# Patient Record
Sex: FEMALE | Race: WHITE | Employment: FULL TIME | URBAN - METROPOLITAN AREA
[De-identification: names, ages, dates, MRNs, and addresses within clinical notes are randomized per-mention and may not be internally consistent; named-entity substitution may affect disease eponyms.]

---

## 2021-11-04 ENCOUNTER — TRANSCRIBE ORDER (OUTPATIENT)
Dept: GENERAL RADIOLOGY | Age: 66
End: 2021-11-04

## 2021-11-04 ENCOUNTER — HOSPITAL ENCOUNTER (OUTPATIENT)
Dept: GENERAL RADIOLOGY | Age: 66
Discharge: HOME | End: 2021-11-04
Payer: COMMERCIAL

## 2021-11-04 DIAGNOSIS — M05.79 RHEUMATOID ARTHRITIS OF MULTIPLE SITES WITHOUT ORGAN OR SYSTEM INVOLVEMENT WITH POSITIVE RHEUMATOID FACTOR (HCC): Primary | ICD-10-CM

## 2021-11-04 DIAGNOSIS — M05.79 RHEUMATOID ARTHRITIS OF MULTIPLE SITES WITHOUT ORGAN OR SYSTEM INVOLVEMENT WITH POSITIVE RHEUMATOID FACTOR (HCC): ICD-10-CM

## 2021-11-04 PROCEDURE — 72050 X-RAY EXAM NECK SPINE 4/5VWS: CPT | Performed by: NURSE PRACTITIONER

## 2021-11-04 PROCEDURE — 73630 X-RAY EXAM OF FOOT: CPT | Performed by: NURSE PRACTITIONER

## 2021-11-04 PROCEDURE — 73562 X-RAY EXAM OF KNEE 3: CPT | Performed by: NURSE PRACTITIONER

## 2021-11-04 PROCEDURE — 72110 X-RAY EXAM L-2 SPINE 4/>VWS: CPT | Performed by: NURSE PRACTITIONER

## 2021-11-04 PROCEDURE — 73130 X-RAY EXAM OF HAND: CPT | Performed by: NURSE PRACTITIONER

## 2022-09-17 ENCOUNTER — HOSPITAL ENCOUNTER (OUTPATIENT)
Age: 67
Setting detail: OBSERVATION
Discharge: HOME | End: 2022-09-18
Attending: EMERGENCY MEDICINE | Admitting: INTERNAL MEDICINE
Payer: COMMERCIAL

## 2022-09-17 ENCOUNTER — APPOINTMENT (OUTPATIENT)
Dept: CT IMAGING | Age: 67
End: 2022-09-17
Attending: EMERGENCY MEDICINE
Payer: COMMERCIAL

## 2022-09-17 ENCOUNTER — APPOINTMENT (OUTPATIENT)
Dept: MRI IMAGING | Age: 67
End: 2022-09-17
Attending: EMERGENCY MEDICINE
Payer: COMMERCIAL

## 2022-09-17 ENCOUNTER — APPOINTMENT (OUTPATIENT)
Dept: GENERAL RADIOLOGY | Age: 67
End: 2022-09-17
Attending: EMERGENCY MEDICINE
Payer: COMMERCIAL

## 2022-09-17 DIAGNOSIS — R42 VERTIGO: Primary | ICD-10-CM

## 2022-09-17 DIAGNOSIS — R29.90 STROKE-LIKE SYMPTOMS: ICD-10-CM

## 2022-09-17 PROBLEM — E86.0 DEHYDRATION: Status: ACTIVE | Noted: 2022-09-17

## 2022-09-17 PROBLEM — I01.1 RHEUMATOID AORTITIS: Status: ACTIVE | Noted: 2022-09-17

## 2022-09-17 PROBLEM — R11.2 NAUSEA AND VOMITING: Status: ACTIVE | Noted: 2022-09-17

## 2022-09-17 LAB
ALBUMIN SERPL-MCNC: 3.8 G/DL (ref 3.5–5)
ALBUMIN/GLOB SERPL: 1.2 {RATIO} (ref 1.1–2.2)
ALP SERPL-CCNC: 86 U/L (ref 45–117)
ALT SERPL-CCNC: 24 U/L (ref 12–78)
ANION GAP SERPL CALC-SCNC: 2 MMOL/L (ref 5–15)
AST SERPL-CCNC: 24 U/L (ref 15–37)
ATRIAL RATE: 64 BPM
BASOPHILS # BLD: 0 K/UL (ref 0–0.1)
BASOPHILS NFR BLD: 1 % (ref 0–1)
BILIRUB SERPL-MCNC: 0.4 MG/DL (ref 0.2–1)
BUN SERPL-MCNC: 12 MG/DL (ref 6–20)
BUN/CREAT SERPL: 13 (ref 12–20)
CALCIUM SERPL-MCNC: 9 MG/DL (ref 8.5–10.1)
CALCULATED P AXIS, ECG09: 81 DEGREES
CALCULATED R AXIS, ECG10: 59 DEGREES
CALCULATED T AXIS, ECG11: 84 DEGREES
CHLORIDE SERPL-SCNC: 108 MMOL/L (ref 97–108)
CO2 SERPL-SCNC: 29 MMOL/L (ref 21–32)
COMMENT, HOLDF: NORMAL
CREAT SERPL-MCNC: 0.92 MG/DL (ref 0.55–1.02)
DIAGNOSIS, 93000: NORMAL
DIFFERENTIAL METHOD BLD: ABNORMAL
EOSINOPHIL # BLD: 0.1 K/UL (ref 0–0.4)
EOSINOPHIL NFR BLD: 2 % (ref 0–7)
ERYTHROCYTE [DISTWIDTH] IN BLOOD BY AUTOMATED COUNT: 12.5 % (ref 11.5–14.5)
GLOBULIN SER CALC-MCNC: 3.2 G/DL (ref 2–4)
GLUCOSE BLD STRIP.AUTO-MCNC: 92 MG/DL (ref 65–117)
GLUCOSE SERPL-MCNC: 122 MG/DL (ref 65–100)
HCT VFR BLD AUTO: 36.3 % (ref 35–47)
HGB BLD-MCNC: 12 G/DL (ref 11.5–16)
IMM GRANULOCYTES # BLD AUTO: 0 K/UL (ref 0–0.04)
IMM GRANULOCYTES NFR BLD AUTO: 0 % (ref 0–0.5)
INR PPP: 1 (ref 0.9–1.1)
LYMPHOCYTES # BLD: 0.7 K/UL (ref 0.8–3.5)
LYMPHOCYTES NFR BLD: 14 % (ref 12–49)
MCH RBC QN AUTO: 30.5 PG (ref 26–34)
MCHC RBC AUTO-ENTMCNC: 33.1 G/DL (ref 30–36.5)
MCV RBC AUTO: 92.4 FL (ref 80–99)
MONOCYTES # BLD: 0.3 K/UL (ref 0–1)
MONOCYTES NFR BLD: 7 % (ref 5–13)
NEUTS SEG # BLD: 3.7 K/UL (ref 1.8–8)
NEUTS SEG NFR BLD: 76 % (ref 32–75)
NRBC # BLD: 0 K/UL (ref 0–0.01)
NRBC BLD-RTO: 0 PER 100 WBC
P-R INTERVAL, ECG05: 208 MS
PLATELET # BLD AUTO: 218 K/UL (ref 150–400)
PMV BLD AUTO: 9.4 FL (ref 8.9–12.9)
POTASSIUM SERPL-SCNC: 3.8 MMOL/L (ref 3.5–5.1)
PROT SERPL-MCNC: 7 G/DL (ref 6.4–8.2)
PROTHROMBIN TIME: 10.8 SEC (ref 9–11.1)
Q-T INTERVAL, ECG07: 442 MS
QRS DURATION, ECG06: 80 MS
QTC CALCULATION (BEZET), ECG08: 455 MS
RBC # BLD AUTO: 3.93 M/UL (ref 3.8–5.2)
RBC MORPH BLD: ABNORMAL
SAMPLES BEING HELD,HOLD: NORMAL
SERVICE CMNT-IMP: NORMAL
SODIUM SERPL-SCNC: 139 MMOL/L (ref 136–145)
TROPONIN-HIGH SENSITIVITY: 5 NG/L (ref 0–51)
VENTRICULAR RATE, ECG03: 64 BPM
WBC # BLD AUTO: 4.8 K/UL (ref 3.6–11)

## 2022-09-17 PROCEDURE — 80053 COMPREHEN METABOLIC PANEL: CPT

## 2022-09-17 PROCEDURE — 74011250636 HC RX REV CODE- 250/636: Performed by: INTERNAL MEDICINE

## 2022-09-17 PROCEDURE — 71045 X-RAY EXAM CHEST 1 VIEW: CPT

## 2022-09-17 PROCEDURE — A9576 INJ PROHANCE MULTIPACK: HCPCS | Performed by: RADIOLOGY

## 2022-09-17 PROCEDURE — 93005 ELECTROCARDIOGRAM TRACING: CPT

## 2022-09-17 PROCEDURE — 96372 THER/PROPH/DIAG INJ SC/IM: CPT

## 2022-09-17 PROCEDURE — 74011250636 HC RX REV CODE- 250/636: Performed by: RADIOLOGY

## 2022-09-17 PROCEDURE — 0042T CT CODE NEURO PERF W CBF: CPT

## 2022-09-17 PROCEDURE — 99285 EMERGENCY DEPT VISIT HI MDM: CPT

## 2022-09-17 PROCEDURE — 74011250636 HC RX REV CODE- 250/636: Performed by: EMERGENCY MEDICINE

## 2022-09-17 PROCEDURE — 74011250637 HC RX REV CODE- 250/637: Performed by: INTERNAL MEDICINE

## 2022-09-17 PROCEDURE — 82962 GLUCOSE BLOOD TEST: CPT

## 2022-09-17 PROCEDURE — 70450 CT HEAD/BRAIN W/O DYE: CPT

## 2022-09-17 PROCEDURE — 74011000636 HC RX REV CODE- 636: Performed by: EMERGENCY MEDICINE

## 2022-09-17 PROCEDURE — 70553 MRI BRAIN STEM W/O & W/DYE: CPT

## 2022-09-17 PROCEDURE — 84484 ASSAY OF TROPONIN QUANT: CPT

## 2022-09-17 PROCEDURE — 85025 COMPLETE CBC W/AUTO DIFF WBC: CPT

## 2022-09-17 PROCEDURE — 96374 THER/PROPH/DIAG INJ IV PUSH: CPT

## 2022-09-17 PROCEDURE — G0378 HOSPITAL OBSERVATION PER HR: HCPCS

## 2022-09-17 PROCEDURE — 70496 CT ANGIOGRAPHY HEAD: CPT

## 2022-09-17 PROCEDURE — 85610 PROTHROMBIN TIME: CPT

## 2022-09-17 PROCEDURE — 74011250637 HC RX REV CODE- 250/637: Performed by: EMERGENCY MEDICINE

## 2022-09-17 PROCEDURE — 36415 COLL VENOUS BLD VENIPUNCTURE: CPT

## 2022-09-17 RX ORDER — ACETAMINOPHEN 325 MG/1
650 TABLET ORAL
Status: DISCONTINUED | OUTPATIENT
Start: 2022-09-17 | End: 2022-09-18 | Stop reason: HOSPADM

## 2022-09-17 RX ORDER — ACETAMINOPHEN 650 MG/1
650 SUPPOSITORY RECTAL
Status: DISCONTINUED | OUTPATIENT
Start: 2022-09-17 | End: 2022-09-18 | Stop reason: HOSPADM

## 2022-09-17 RX ORDER — LABETALOL HYDROCHLORIDE 5 MG/ML
5 INJECTION, SOLUTION INTRAVENOUS
Status: DISCONTINUED | OUTPATIENT
Start: 2022-09-17 | End: 2022-09-18 | Stop reason: HOSPADM

## 2022-09-17 RX ORDER — GUAIFENESIN 100 MG/5ML
81 LIQUID (ML) ORAL DAILY
Status: DISCONTINUED | OUTPATIENT
Start: 2022-09-18 | End: 2022-09-18 | Stop reason: HOSPADM

## 2022-09-17 RX ORDER — MELOXICAM 15 MG/1
15 TABLET ORAL DAILY
COMMUNITY

## 2022-09-17 RX ORDER — MELOXICAM 7.5 MG/1
15 TABLET ORAL DAILY
Status: DISCONTINUED | OUTPATIENT
Start: 2022-09-18 | End: 2022-09-18 | Stop reason: HOSPADM

## 2022-09-17 RX ORDER — HYDROXYCHLOROQUINE SULFATE 200 MG/1
200 TABLET, FILM COATED ORAL 2 TIMES DAILY
Status: DISCONTINUED | OUTPATIENT
Start: 2022-09-17 | End: 2022-09-18 | Stop reason: HOSPADM

## 2022-09-17 RX ORDER — DIAZEPAM 5 MG/1
5 TABLET ORAL
Status: COMPLETED | OUTPATIENT
Start: 2022-09-17 | End: 2022-09-17

## 2022-09-17 RX ORDER — GUAIFENESIN 100 MG/5ML
324 LIQUID (ML) ORAL
Status: COMPLETED | OUTPATIENT
Start: 2022-09-17 | End: 2022-09-17

## 2022-09-17 RX ORDER — ENOXAPARIN SODIUM 100 MG/ML
40 INJECTION SUBCUTANEOUS EVERY 24 HOURS
Status: DISCONTINUED | OUTPATIENT
Start: 2022-09-17 | End: 2022-09-18 | Stop reason: HOSPADM

## 2022-09-17 RX ORDER — MECLIZINE HCL 12.5 MG 12.5 MG/1
25 TABLET ORAL 3 TIMES DAILY
Status: DISCONTINUED | OUTPATIENT
Start: 2022-09-17 | End: 2022-09-18 | Stop reason: HOSPADM

## 2022-09-17 RX ORDER — ONDANSETRON 2 MG/ML
4 INJECTION INTRAMUSCULAR; INTRAVENOUS
Status: COMPLETED | OUTPATIENT
Start: 2022-09-17 | End: 2022-09-17

## 2022-09-17 RX ORDER — POLYETHYLENE GLYCOL 3350 17 G/17G
17 POWDER, FOR SOLUTION ORAL DAILY PRN
Status: DISCONTINUED | OUTPATIENT
Start: 2022-09-17 | End: 2022-09-18 | Stop reason: HOSPADM

## 2022-09-17 RX ORDER — HYDROXYCHLOROQUINE SULFATE 200 MG/1
200 TABLET, FILM COATED ORAL 2 TIMES DAILY
COMMUNITY

## 2022-09-17 RX ORDER — SODIUM CHLORIDE 9 MG/ML
100 INJECTION, SOLUTION INTRAVENOUS CONTINUOUS
Status: DISCONTINUED | OUTPATIENT
Start: 2022-09-17 | End: 2022-09-18

## 2022-09-17 RX ORDER — ATORVASTATIN CALCIUM 20 MG/1
40 TABLET, FILM COATED ORAL
Status: DISCONTINUED | OUTPATIENT
Start: 2022-09-17 | End: 2022-09-18 | Stop reason: HOSPADM

## 2022-09-17 RX ADMIN — ONDANSETRON 4 MG: 2 INJECTION INTRAMUSCULAR; INTRAVENOUS at 10:45

## 2022-09-17 RX ADMIN — MECLIZINE 25 MG: 12.5 TABLET ORAL at 21:33

## 2022-09-17 RX ADMIN — SODIUM CHLORIDE 100 ML/HR: 9 INJECTION, SOLUTION INTRAVENOUS at 21:58

## 2022-09-17 RX ADMIN — DIAZEPAM 5 MG: 5 TABLET ORAL at 11:10

## 2022-09-17 RX ADMIN — ENOXAPARIN SODIUM 40 MG: 100 INJECTION SUBCUTANEOUS at 14:40

## 2022-09-17 RX ADMIN — IOPAMIDOL 50 ML: 755 INJECTION, SOLUTION INTRAVENOUS at 10:37

## 2022-09-17 RX ADMIN — MECLIZINE 25 MG: 12.5 TABLET ORAL at 14:40

## 2022-09-17 RX ADMIN — GADOTERIDOL 14 ML: 279.3 INJECTION, SOLUTION INTRAVENOUS at 13:30

## 2022-09-17 RX ADMIN — IOPAMIDOL 100 ML: 755 INJECTION, SOLUTION INTRAVENOUS at 10:38

## 2022-09-17 RX ADMIN — HYDROXYCHLOROQUINE SULFATE 200 MG: 200 TABLET ORAL at 18:53

## 2022-09-17 RX ADMIN — ASPIRIN 324 MG: 81 TABLET, CHEWABLE ORAL at 12:02

## 2022-09-17 RX ADMIN — SODIUM CHLORIDE 100 ML/HR: 9 INJECTION, SOLUTION INTRAVENOUS at 14:41

## 2022-09-17 NOTE — H&P
[facility]  [address] [facility], [facility] [address]  [phone]    Admission History and Physical      NAME:  Milagros Anthony   :   [id number]   MRN:  [id number]     PCP:  Leoncio Calles MD     Date of service:  2022         Subjective:     CHIEF COMPLAINT: Dizziness, Nausea and vomiting     HISTORY OF PRESENT ILLNESS:     Ms. Giselle Wright is a 73 y.o.  female with past medical history of rheumatoid arthritis, and mild intermittent asthma is admitted with the diagnosis of Vertigo to rule posterior circulation stroke. Ms. Giselle Wright presented to the Emergency Department today complaining of  dizziness. She was known last well at 11PM when she went to bed last night. She woke up to use bathroom this morning at around 7AM and noticed everything was spinning around. She went back to bed but since then every movement of her head triggers the spinning. She also felt nauseous and started to vomiting. She reports she vomiting several times since this morning and then dry heave. She reports generalized weakness. She denies fever, chest pain, cough, sore throat, abdominal pain, diarrhea, constipation or urinary frequency, urgency or dysuria. Vital signs on arrival to ER BP: 132/54 mmHg, WY: 61, Temp: 97.6, RR: 12 and saturating 100% on room air. CBC and CMP are unremarkable. Initial troponin is wnl. CT head, CTA head neck stroke protocol unremarkable. Past Medical History:   Diagnosis Date    Asthma     Atypical ductal hyperplasia of breast 2011    Breast lesion 2011    Hypotension     USUALLY RUNS 90/60.     Unspecified adverse effect of anesthesia     TOOK ALL DAY COMING OUT OF ANESTHESIA /P ABLATION        Past Surgical History:   Procedure Laterality Date    HX DILATION AND CURETTAGE      ENDOMETRIOSIS    HX DILATION AND CURETTAGE  /P DELIVERY    HX GI      COLONOSCOPY    HX GYN  2007    ABLATION       Social History     Tobacco Use    Smoking status: Never Smokeless tobacco: Not on file   Substance Use Topics    Alcohol use: No        Family History   Problem Relation Age of Onset    Other Mother          OF LATENT HEP C FROM BLOOD TRANSFUSION    Cancer Father         THROAT & TONGUE-NON-SMOKER    Cancer Sister          11 OF COLON CA / OVARIAN        No Known Allergies     Prior to Admission medications    Medication Sig Start Date End Date Taking? Authorizing Provider   hydrocodone-acetaminophen (VICODIN) 5-500 mg per tablet Take 1 Tab by mouth every four (4) hours as needed for Pain. 11   Corina Campbell NP   hydrocodone-acetaminophen (VICODIN) 5-500 mg per tablet Take 1 Tab by mouth every three (3) hours as needed for Pain. 11   Zack Mendez MD   cetirizine (ZYRTEC) 10 mg tablet Take  by mouth daily. Indications: ASTHMA, ALLERGY    Provider, Historical   albuterol (VENTOLIN HFA) 90 mcg/Actuation inhaler Take 1 Puff by inhalation every six (6) hours as needed. Provider, Historical   budesonide (RHINOCORT AQUA) 32 mcg/Actuation nasal spray by Nasal route as needed. Provider, Historical   MULTIVITAMIN PO Take  by mouth daily. Provider, Historical   DOCOSAHEXANOIC ACID/EPA (FISH OIL PO) Take  by mouth daily. Provider, Historical   LYSINE, BULK, by Does Not Apply route daily. Indications: MOUTH ULCERS    Provider, Historical   CALCIUM CARBONATE/VITAMIN D3 (CALCIUM + D PO) Take  by mouth. Provider, Historical   GLUCOSAM SUL NA/CHONDR LINDQUIST A NA (GLUCOSAMINE-CHONDROITIN 3X PO) Take  by mouth daily.     Provider, Historical         Review of Systems:  (bold if positive, if negative)    Gen:  , fatigue  Eyes:  ENT:  CVS:  dizziness, Pulm:  GI:  nausea, emesis, :  MS:   weakness,Skin:  Psych:  Endo:  Hem:  Renal:  Neuro:   - Dizziness         Objective:      VITALS:    Vital signs reviewed; most recent are:    Visit Vitals  BP (!) 116/52   Pulse 61   Temp 97.6 °F (36.4 °C)   Resp 14   Ht 5' 6\" (1.676 m)   Wt 72.7 kg (160 lb 4.8 oz)   SpO2 98%   BMI 25.87 kg/m²     SpO2 Readings from Last 6 Encounters:   09/17/22 98%   09/02/11 100%        No intake or output data in the 24 hours ending 09/17/22 1230         Exam:     Physical Exam:    Gen:  Well-developed, well-nourished, in no acute distress  HEENT:  Pink conjunctivae, PERRL, hearing intact to voice, moist mucous membranes. No nystagmus   Neck:  Supple, without masses, thyroid non-tender  Resp:  No accessory muscle use, clear breath sounds without wheezes rales or rhonchi  Card:  No murmurs, normal S1, S2 without thrills, bruits or peripheral edema  Abd:  Soft, non-tender, non-distended, normoactive bowel sounds are present  Lymph:  No cervical adenopathy  Musc:  No cyanosis or clubbing  Skin:  No rashes or ulcers, skin turgor is good  Neuro:  Cranial nerves are grossly intact, no focal motor weakness, follows commands appropriately  Psych:  Good insight, oriented to person, place and time, alert       Labs:    Recent Labs     09/17/22  1020   WBC 4.8   HGB 12.0   HCT 36.3        Recent Labs     09/17/22  1020      K 3.8      CO2 29   *   BUN 12   CREA 0.92   CA 9.0   ALB 3.8   TBILI 0.4   ALT 24     Lab Results   Component Value Date/Time    Glucose (POC) 92 09/17/2022 10:17 AM     No results for input(s): PH, PCO2, PO2, HCO3, FIO2 in the last 72 hours. Recent Labs     09/17/22  1020   INR 1.0       Telemetry reviewed:   normal sinus rhythm       Assessment/Plan:      Active Problems:  Nausea and vomiting (9/17/2022): IV Zofran for nausea/vomiting as needed    Vertigo (9/17/2022): rule out posterior circulation CVA, Brain MRI wo contrast. Neurology consult. ASA, Statin, rest of stroke protocol. She still reports vertigo and closing her eyes to avoid it.  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 discussed with: Patient and Nursing Staff    Discussed:  Care Plan    Prophylaxis:  Lovenox    Probable Disposition:  Home w/Family           ___________________________________________________    Attending Physician: Dante Malone MD

## 2022-09-17 NOTE — PROGRESS NOTES
1400 TRANSFER - IN REPORT:    Verbal report received from [facility] (name) on Duran Ellsworth  being received from ED (unit) for routine progression of care      Report consisted of patients Situation, Background, Assessment and   Recommendations(SBAR). Information from the following report(s) SBAR, Kardex, Intake/Output, MAR, Accordion, Recent Results, Med Rec Status, and Cardiac Rhythm NSR  was reviewed with the receiving nurse. Opportunity for questions and clarification was provided. Assessment completed upon patients arrival to unit and care assumed. This patient was assisted with Intentional Toileting every 2 hours during this shift as appropriate. Documentation of ambulation and output reflected on Flowsheet as appropriate. Purposeful hourly rounding was completed using AIDET and 5Ps. Outcomes of PHR documented as they occurred. Bed alarm in use as appropriate. Dual Suction and ambubag in place. 0915 Bedside and Verbal shift change report given to [address] [place] [address]  (oncoming nurse) by Miriam Weaver RN  (offgoing nurse). Report included the following information SBAR, Kardex, Intake/Output, MAR, Accordion, Recent Results, Med Rec Status, and Cardiac Rhythm NSR .

## 2022-09-17 NOTE — ED TRIAGE NOTES
Patient arrives from home via EMS with complaints of nausea and vomiting that started at 0200. Per EMS, patient experienced dizziness when they tried to get her out of bed. Patient reports generalized weakness. Patient's LKW 3352. Level 2 stroke called at 1014.

## 2022-09-17 NOTE — ED PROVIDER NOTES
Terrance Law is a 72 yo F with vertigo, nausea and vomiting. She woke in the early morning hours with symptoms. Last normal at 11pm when she went to bed. Vertigo constant , even when sitting still. Has nausea and vomiting but no diarrhea. Patient denies recent illness. Past Medical History:   Diagnosis Date    Asthma     Atypical ductal hyperplasia of breast 2011    Breast lesion 2011    Hypotension     USUALLY RUNS 90/60.  Unspecified adverse effect of anesthesia 2007    TOOK ALL DAY COMING OUT OF ANESTHESIA /P ABLATION       Past Surgical History:   Procedure Laterality Date    HX DILATION AND CURETTAGE      ENDOMETRIOSIS    HX DILATION AND CURETTAGE  /P DELIVERY    HX GI      COLONOSCOPY    HX GYN  2007    ABLATION         Family History:   Problem Relation Age of Onset    Other Mother          OF LATENT HEP C FROM BLOOD TRANSFUSION    Cancer Father         THROAT & TONGUE-NON-SMOKER    Cancer Sister          11 OF COLON CA / OVARIAN       Social History     Socioeconomic History    Marital status:      Spouse name: Not on file    Number of children: Not on file    Years of education: Not on file    Highest education level: Not on file   Occupational History    Not on file   Tobacco Use    Smoking status: Never    Smokeless tobacco: Not on file   Substance and Sexual Activity    Alcohol use: No    Drug use: Not on file    Sexual activity: Not on file   Other Topics Concern    Not on file   Social History Narrative    Not on file     Social Determinants of Health     Financial Resource Strain: Not on file   Food Insecurity: Not on file   Transportation Needs: Not on file   Physical Activity: Not on file   Stress: Not on file   Social Connections: Not on file   Intimate Partner Violence: Not on file   Housing Stability: Not on file         ALLERGIES: Patient has no known allergies. Review of Systems   HENT:  Negative for sore throat.     Eyes: Negative for visual disturbance. Respiratory:  Negative for cough. Skin:  Negative for rash. Vitals:    09/17/22 1014 09/17/22 1026 09/17/22 1114   BP: (!) 132/54  (!) 117/56   Pulse: 61  63   Resp: 12  21   Temp: 97.6 °F (36.4 °C)     SpO2: 100% 97% 100%   Weight: 72.7 kg (160 lb 4.8 oz)     Height: 5' 6\" (1.676 m)              Physical Exam  Vitals and nursing note reviewed. Constitutional:       General: She is not in acute distress. Appearance: She is well-developed. HENT:      Head: Normocephalic and atraumatic. Right Ear: Tympanic membrane normal. No middle ear effusion. There is no impacted cerumen. Left Ear: Tympanic membrane normal.  No middle ear effusion. There is no impacted cerumen. Mouth/Throat:      Mouth: Mucous membranes are moist.   Eyes:      Extraocular Movements: Extraocular movements intact. Right eye: Nystagmus present. Left eye: Nystagmus present. Conjunctiva/sclera: Conjunctivae normal.   Neck:      Trachea: Phonation normal.   Cardiovascular:      Rate and Rhythm: Normal rate. Pulmonary:      Effort: Pulmonary effort is normal. No respiratory distress. Abdominal:      General: There is no distension. Musculoskeletal:         General: No tenderness. Normal range of motion. Cervical back: Normal range of motion. Skin:     General: Skin is warm and dry. Neurological:      General: No focal deficit present. Mental Status: She is alert. She is not disoriented. Cranial Nerves: No dysarthria or facial asymmetry. Motor: No weakness, abnormal muscle tone or pronator drift. ED EKG interpretation:  Rhythm: normal sinus rhythm; and regular . Rate (approx.): 64; Axis: normal; P wave: normal; QRS interval: normal ; ST/T wave: normal; Other findings: normal. This EKG was interpreted by Thayne Mohs, MD,ED Provider. [facility]       10:25 AM  Discussed with teleneuro, Dr. Montell Heimlich. Will evaluate patient.     10:52 AM  Discussed with Dr. Alana Street, Teleneurology. Agrees exam consistent with posterior circulation stroke not peripheral cause. CTA/perfusion reviewed and are normal.  Recommend admission for stoke workup    Perfect Serve Consult for Admission  11:37 AM    ED Room Number: ER07/07  Patient Name and age:  Quentin Pond 73 y.o.  female  Working Diagnosis:   1. Vertigo    2. Stroke-like symptoms        COVID-19 Suspicion:  no  Sepsis present:  no  Reassessment needed: N/A  Code Status:  Full Code  Readmission: no  Isolation Requirements:  no  Recommended Level of Care:  telemetry  Department:[facility] ED - [phone]  Other:  woke in the early morning hours with vertigo. Exam consistent with posterior circulation cause rather than peripheral.  Underwent Level 2 stroke eval on arrival.  CT/CTA/Perfusion studies with no acute finding. Teleneurologist recommends admission for stroke work-up. Has received zofran, aspirin and Valium for symptoms.        Procedures

## 2022-09-18 ENCOUNTER — APPOINTMENT (OUTPATIENT)
Dept: NON INVASIVE DIAGNOSTICS | Age: 67
End: 2022-09-18
Attending: INTERNAL MEDICINE
Payer: COMMERCIAL

## 2022-09-18 VITALS
RESPIRATION RATE: 16 BRPM | DIASTOLIC BLOOD PRESSURE: 46 MMHG | SYSTOLIC BLOOD PRESSURE: 104 MMHG | OXYGEN SATURATION: 97 % | BODY MASS INDEX: 24.75 KG/M2 | HEART RATE: 62 BPM | HEIGHT: 66 IN | WEIGHT: 154 LBS | TEMPERATURE: 97.9 F

## 2022-09-18 LAB
CHOLEST SERPL-MCNC: 103 MG/DL
ECHO AV AREA PEAK VELOCITY: 2.3 CM2
ECHO AV AREA/BSA PEAK VELOCITY: 1.3 CM2/M2
ECHO AV PEAK GRADIENT: 10 MMHG
ECHO AV PEAK VELOCITY: 1.6 M/S
ECHO AV VELOCITY RATIO: 0.69
ECHO EST RA PRESSURE: 3 MMHG
ECHO LA DIAMETER INDEX: 1.84 CM/M2
ECHO LA DIAMETER: 3.3 CM
ECHO LA VOL 2C: 63 ML (ref 22–52)
ECHO LA VOL 4C: 61 ML (ref 22–52)
ECHO LA VOL BP: 65 ML (ref 22–52)
ECHO LA VOL/BSA BIPLANE: 36 ML/M2 (ref 16–34)
ECHO LA VOLUME AREA LENGTH: 75 ML
ECHO LA VOLUME INDEX A2C: 35 ML/M2 (ref 16–34)
ECHO LA VOLUME INDEX A4C: 34 ML/M2 (ref 16–34)
ECHO LA VOLUME INDEX AREA LENGTH: 42 ML/M2 (ref 16–34)
ECHO LV E' LATERAL VELOCITY: 14 CM/S
ECHO LV E' SEPTAL VELOCITY: 10 CM/S
ECHO LV FRACTIONAL SHORTENING: 31 % (ref 28–44)
ECHO LV INTERNAL DIMENSION DIASTOLE INDEX: 2.68 CM/M2
ECHO LV INTERNAL DIMENSION DIASTOLIC: 4.8 CM (ref 3.9–5.3)
ECHO LV INTERNAL DIMENSION SYSTOLIC INDEX: 1.84 CM/M2
ECHO LV INTERNAL DIMENSION SYSTOLIC: 3.3 CM
ECHO LV IVSD: 0.7 CM (ref 0.6–0.9)
ECHO LV MASS 2D: 106.9 G (ref 67–162)
ECHO LV MASS INDEX 2D: 59.7 G/M2 (ref 43–95)
ECHO LV POSTERIOR WALL DIASTOLIC: 0.7 CM (ref 0.6–0.9)
ECHO LV RELATIVE WALL THICKNESS RATIO: 0.29
ECHO LVOT AREA: 3.1 CM2
ECHO LVOT DIAM: 2 CM
ECHO LVOT PEAK GRADIENT: 5 MMHG
ECHO LVOT PEAK VELOCITY: 1.1 M/S
ECHO MV A VELOCITY: 0.81 M/S
ECHO MV E DECELERATION TIME (DT): 212.2 MS
ECHO MV E VELOCITY: 0.84 M/S
ECHO MV E/A RATIO: 1.04
ECHO MV E/E' LATERAL: 6
ECHO MV E/E' RATIO (AVERAGED): 7.2
ECHO MV E/E' SEPTAL: 8.4
ECHO RIGHT VENTRICULAR SYSTOLIC PRESSURE (RVSP): 20 MMHG
ECHO RV FREE WALL PEAK S': 14 CM/S
ECHO RV INTERNAL DIMENSION: 3.8 CM
ECHO RV TAPSE: 2.4 CM (ref 1.7–?)
ECHO RVOT PEAK GRADIENT: 2 MMHG
ECHO RVOT PEAK VELOCITY: 0.6 M/S
ECHO TV REGURGITANT MAX VELOCITY: 2.04 M/S
ECHO TV REGURGITANT PEAK GRADIENT: 17 MMHG
EST. AVERAGE GLUCOSE BLD GHB EST-MCNC: 103 MG/DL
HBA1C MFR BLD: 5.2 % (ref 4–5.6)
HDLC SERPL-MCNC: 74 MG/DL
HDLC SERPL: 1.4 {RATIO} (ref 0–5)
LDLC SERPL CALC-MCNC: 18 MG/DL (ref 0–100)
TRIGL SERPL-MCNC: 55 MG/DL (ref ?–150)
VLDLC SERPL CALC-MCNC: 11 MG/DL

## 2022-09-18 PROCEDURE — 99204 OFFICE O/P NEW MOD 45 MIN: CPT | Performed by: PSYCHIATRY & NEUROLOGY

## 2022-09-18 PROCEDURE — 93306 TTE W/DOPPLER COMPLETE: CPT | Performed by: SPECIALIST

## 2022-09-18 PROCEDURE — 36415 COLL VENOUS BLD VENIPUNCTURE: CPT

## 2022-09-18 PROCEDURE — 74011250637 HC RX REV CODE- 250/637: Performed by: INTERNAL MEDICINE

## 2022-09-18 PROCEDURE — G0378 HOSPITAL OBSERVATION PER HR: HCPCS

## 2022-09-18 PROCEDURE — 93306 TTE W/DOPPLER COMPLETE: CPT

## 2022-09-18 PROCEDURE — 74011250636 HC RX REV CODE- 250/636: Performed by: INTERNAL MEDICINE

## 2022-09-18 PROCEDURE — 83036 HEMOGLOBIN GLYCOSYLATED A1C: CPT

## 2022-09-18 PROCEDURE — 80061 LIPID PANEL: CPT

## 2022-09-18 PROCEDURE — 97161 PT EVAL LOW COMPLEX 20 MIN: CPT

## 2022-09-18 PROCEDURE — 97165 OT EVAL LOW COMPLEX 30 MIN: CPT | Performed by: OCCUPATIONAL THERAPIST

## 2022-09-18 RX ORDER — MECLIZINE HYDROCHLORIDE 25 MG/1
25 TABLET ORAL
Qty: 30 TABLET | Refills: 0 | Status: SHIPPED | OUTPATIENT
Start: 2022-09-18 | End: 2022-09-28

## 2022-09-18 RX ADMIN — HYDROXYCHLOROQUINE SULFATE 200 MG: 200 TABLET ORAL at 09:11

## 2022-09-18 RX ADMIN — ASPIRIN 81 MG: 81 TABLET, CHEWABLE ORAL at 09:11

## 2022-09-18 RX ADMIN — MELOXICAM 15 MG: 7.5 TABLET ORAL at 09:12

## 2022-09-18 RX ADMIN — MECLIZINE 25 MG: 12.5 TABLET ORAL at 09:11

## 2022-09-18 NOTE — PROGRESS NOTES
Bedside and Verbal shift change report given to Radha (oncoming nurse) by Suzan Banuelos (offgoing nurse). Report included the following information SBAR, ED Summary, Intake/Output, MAR, and Cardiac Rhythm NSR .

## 2022-09-18 NOTE — PROGRESS NOTES
9/18/2022  10:15 AM  Care Management Assessment      Reason for Admission: Emergency -       ICD-10-CM ICD-9-CM    1. Vertigo  R42 780.4       2. Stroke-like symptoms  R29.90 781.99           Assessment:   [x]In person with pt   []Via p/c with pt   []With family member in person. Who/Relation:     []With family member via p/c. Who/Relation:   []Chart Review    RUR: NA - OBS  Risk Level: [x]Low []Moderate []High  Value-based purchasing: [] Yes [x] No    Advance Directive: Full Code.    [] No AD on file. [x] AD on file. [] Current AD not on file. Copy requested. [] Requests AD, and referral submitted to [facility]. Healthcare Decision Maker: Darin Bernard -         Assessment:    Age: 73 y.o. Sex: [] Male [x]Female     Residency: [x]Private residence []Apartment []Assisted Living []LTC []Other:   Exterior Steps: 2  Interior Steps: 0    Lives With: [x]With spouse []Other family members []Underage children []Alone []Care provider []Other:    Prior functioning:  [x]Independent with ADLs and iADLS []Dependent with ADLs and iADLs []Partial dependence, Specify:     Cognition: [x]Intact []Some spheres some of the time. []Some spheres all of the time. []All spheres all of the time.      Prior transportation method: [x]Self []Spouse []Other family members []Medicaid transport []Other:     Prior DME required:  [x]None []RW []Cane []Crutches []Bedside commode []CPAP []Home O2 (Liter/Provider: ) []Nebulizer   []Shower Chair []Wheelchair []Hospital Bed []Ean []Stair lift []Rollator []Other:    DME available: [x]None []RW []Cane []Crutches []Bedside commode []CPAP []Home O2 (Liter/Provider: ) []Nebulizer   []Shower Chair []Wheelchair []Hospital Bed []Ean []Stair lift []Rollator []Other:    Rehab history: [x]None []Outpatient PT []Home Health (Provider/Date: ) []SNF (Provider/Date: ) []IPR (Provider/Date: ) []LTC (Provider/Date: ) []Hospice (Provider/Date: )  []Other:     Covid vaccination status:   [] Yes, Type:  [] Booster 1 [] Booster 2  [] No  [x] N/A / Not asked    Insurer:   Insurance Information                  CIGNA/BSHSI CIGNA PPO Phone: --    Subscriber: Sarah Terrell Subscriber#: [id number]    Group#: [id number] Precert#: --            PCP: Shemar Tafoya   Address: [address] [place] [id number]   Phone number: [phone]   Current patient: [x]Yes []No   Approximate date of last visit: within 1 year   Access to virtual PCP visits: []Yes [x]No     Financial concerns/barriers: []Yes, explain: [x]No []Unknown/Not discussed    Pharmacy: [address] Transport: Family     Discharge Concerns: []Yes [x]No []Unknown   Describe:    Comments:           Transition of care plan:    []Unable to determine at this time. Awaiting clinical progress, and disposition recommendations. [x] Home with family assistance as needed, and outpatient follow-up. [] Home with Outpatient PT and outpatient follow-up   Pt aware of OP appt? []Yes, Provider:   []Not scheduled   Transport provider:     [] Home with Home Health   - Provider:     []SNF/IPR   -[]Preferences given:   []Listing provided and preferences requested   -Status: []Pending []Accepted:    -Auth required: []Yes []No    -Auth initiated date:   -3 midnight stay required: []Yes []No  Date satisfied:     [] LTC:     [] Home with Hospice   -Provider:     [] [facility] information provided. [] Other:     William Giron MA    Care Management Interventions  PCP Verified by CM: Yes Alexei Barth)  Mode of Transport at Discharge:  Other (see comment)  MyChart Signup: No  Discharge Durable Medical Equipment: No  Physical Therapy Consult: Yes  Occupational Therapy Consult: Yes  Speech Therapy Consult: No  Support Systems: Spouse/Significant Other  Confirm Follow Up Transport: Family  Discharge Location  Patient Expects to be Discharged to[de-identified] Home with family assistance

## 2022-09-18 NOTE — PROGRESS NOTES
Bedside and Verbal shift change report given to Keily Rodriguez (oncoming nurse) by Waldo Wall (offgoing nurse). Report included the following information SBAR, Kardex, ED Summary, Intake/Output, MAR, Recent Results, and Cardiac Rhythm NSR .

## 2022-09-18 NOTE — PROGRESS NOTES
Hospitalist Progress Note              Florin Billy MD.                                                             Cell: [phone]                               NAME:  Demetris Estes  :  1955  MRN:  [id number]  Date of Service:  2022    Summary: 73 y.o. female with past medical history of rheumatoid arthritis, and mild intermittent asthma is admitted with the diagnosis of Vertigo to rule posterior circulation stroke. Ms. Garett Go presented to the ER complaining of  dizziness, nausea and vomiting       Assessment/Plan:  Dizziness, nausea and vomiting: Rule out posterior circulation stroke  Other DD benign position vertigi  CTA of the head/neck: No evidence of significant stenosis or aneurysm. MRI of the brain negative for stroke  Continue aspirin. F/up with neurology  PT consult  Meclizine as needed for dizziness    H/o rheumatoid Arthritis: Continue plaquenil and meloxican       Code status: Full  DVT prophylaxsis: Lovenox  Dispo: D/c today         Interval History/Subjective: Follow up for dizziness  No acute overnight event  Dizziness currently resolved. N/V resolved  No extremity weakness    Review of Systems:  A comprehensive review of systems was negative except for that written in the HPI. Objective:     VITALS:   Last 24hrs VS reviewed since prior progress note. Most recent are:  Visit Vitals  /60 (BP 1 Location: Right upper arm, BP Patient Position: At rest)   Pulse 67   Temp (!) 95.4 °F (35.2 °C)   Resp 14   Ht 5' 6\" (1.676 m)   Wt 70.2 kg (154 lb 12.2 oz)   SpO2 99%   BMI 24.98 kg/m²       Intake/Output Summary (Last 24 hours) at 2022 0935  Last data filed at 2022 0913  Gross per 24 hour   Intake 220 ml   Output --   Net 220 ml        PHYSICAL EXAM:  General: No acute distress, cooperative, pleasant   EENT: EOMI. Anicteric sclerae. Oral mucous moist, oropharynx benign  Resp: CTA bilaterally.  No wheezing/rhonchi/rales. No accessory muscle use  CV: Regular rhythm, normal rate, no murmurs, gallops, rubs  GI: Soft, non distended, non tender. normoactive bowel sounds, no hepatosplenomegaly Extremities: No edema, warm, 2+ pulses throughout  Neurologic: Moves all extremities. AAOx3, CN II-XII grossly intact  Psych: Good insight. Not anxious nor agitated. Skin: Good Turgor, no rashes or ulcers    Lab Data Personally Reviewed: (see below)     Medications list Personally Reviewed:  x YES  NO     _______________________________________________________________________  Care Plan discussed with:  Patient/Family and Nurse    Total NON critical care TIME:  30 minutes    Ishmael Galindo MD     Procedures: see electronic medical records for all procedures/Xrays and details which were not copied into this note but were reviewed prior to creation of Plan. LABS:  Recent Labs     09/17/22  1020   WBC 4.8   HGB 12.0   HCT 36.3        Recent Labs     09/17/22  1020      K 3.8      CO2 29   BUN 12   CREA 0.92   *   CA 9.0     Recent Labs     09/17/22  1020   ALT 24   AP 86   TBILI 0.4   TP 7.0   ALB 3.8   GLOB 3.2     Recent Labs     09/17/22  1020   INR 1.0   PTP 10.8      No results for input(s): FE, TIBC, PSAT, FERR in the last 72 hours. No results found for: FOL, RBCF   No results for input(s): PH, PCO2, PO2 in the last 72 hours. No results for input(s): CPK, CKNDX, TROIQ in the last 72 hours.     No lab exists for component: CPKMB  No results found for: CHOL, CHOLX, CHLST, CHOLV, HDL, HDLP, LDL, LDLC, DLDLP, TGLX, TRIGL, TRIGP, CHHD, CHHDX  Lab Results   Component Value Date/Time    Glucose (POC) 92 09/17/2022 10:17 AM     No results found for: COLOR, APPRN, SPGRU, REFSG, GERARDO, PROTU, GLUCU, KETU, BILU, UROU, NIECY, LEUKU, GLUKE, EPSU, BACTU, WBCU, RBCU, CASTS, UCRY

## 2022-09-18 NOTE — DISCHARGE SUMMARY
Discharge Summary       PATIENT ID: Tamela Joseph  MRN: [id number]   YOB: 1955    DATE OF ADMISSION: 9/17/2022 10:09 AM    DATE OF DISCHARGE: 9/18/2022  PRIMARY CARE PROVIDER: Wes Grewal MD       DISCHARGING PHYSICIAN: Sri Yeager MD    To contact this individual call [phone] and ask the  to page. If unavailable ask to be transferred the Adult Hospitalist Department. CONSULTATIONS: IP CONSULT TO HOSPITALIST  IP CONSULT TO NEUROLOGY    PROCEDURES/SURGERIES: * No surgery found *    ADMITTING DIAGNOSES & HOSPITAL COURSE:   73 y.o. female with past medical history of rheumatoid arthritis, and mild intermittent asthma is admitted with the diagnosis of Vertigo to rule posterior circulation stroke. Ms. Gerson Gardiner presented to the ER complaining of  dizziness, nausea and vomiting      A/P    Dizziness, nausea and vomiting: Rule out posterior circulation stroke  CTA of the head/neck: No evidence of significant stenosis or aneurysm.    MRI of the brain negative for stroke  Suspect dizziness likely due to benign paroxysmal vertigo  Neurology evaluated  Recommend follow up with ENT if dizziness re-occurs  Meclizine as needed for dizziness     H/o rheumatoid Arthritis: Continue plaquenil and meloxican     Dispo: D/c home with self/family care    [id number] [place] [address] [address] / PLAN:      As above       PENDING TEST RESULTS:   At the time of discharge the following test results are still pending:     FOLLOW UP APPOINTMENTS:    Follow-up Information       Follow up With Specialties Details Why MD [address]  [place] [id number] [phone]               ADDITIONAL CARE RECOMMENDATIONS:     DIET: Regular Diet    ACTIVITY: Activity as tolerated    WOUND CARE:     EQUIPMENT needed:       DISCHARGE MEDICATIONS:  Current Discharge Medication List        START taking these medications    Details   meclizine (ANTIVERT) 25 mg tablet Take 1 Tablet by mouth three (3) times daily as needed for Dizziness for up to 10 days. Qty: 30 Tablet, Refills: 0  Start date: 9/18/2022, End date: 9/28/2022           CONTINUE these medications which have NOT CHANGED    Details   hydrOXYchloroQUINE (PlaqueniL) 200 mg tablet Take 200 mg by mouth two (2) times a day. Indications: rheumatoid arthritis      meloxicam (MOBIC) 15 mg tablet Take 15 mg by mouth daily. Indications: rheumatoid arthritis      MULTIVITAMIN PO Take  by mouth daily. LYSINE, BULK, by Does Not Apply route daily. Indications: MOUTH ULCERS      CALCIUM CARBONATE/VITAMIN D3 (CALCIUM + D PO) Take  by mouth. cetirizine (ZYRTEC) 10 mg tablet Take  by mouth daily. Indications: ASTHMA, ALLERGY      albuterol (PROVENTIL HFA, VENTOLIN HFA, PROAIR HFA) 90 mcg/actuation inhaler Take 1 Puff by inhalation every six (6) hours as needed. DOCOSAHEXANOIC ACID/EPA (FISH OIL PO) Take  by mouth daily. GLUCOSAM SUL NA/CHONDR LINDQUIST A NA (GLUCOSAMINE-CHONDROITIN 3X PO) Take  by mouth daily. STOP taking these medications       hydrocodone-acetaminophen (VICODIN) 5-500 mg per tablet Comments:   Reason for Stopping:         hydrocodone-acetaminophen (VICODIN) 5-500 mg per tablet Comments:   Reason for Stopping:         budesonide (RHINOCORT AQUA) 32 mcg/actuation nasal spray Comments:   Reason for Stopping:                 NOTIFY YOUR PHYSICIAN FOR ANY OF THE FOLLOWING:   Fever over 101 degrees for 24 hours. Chest pain, shortness of breath, fever, chills, nausea, vomiting, diarrhea, change in mentation, falling, weakness, bleeding. Severe pain or pain not relieved by medications. Or, any other signs or symptoms that you may have questions about.     DISPOSITION:  x  Home With:   OT  PT  HH  RN       Long term SNF/Inpatient Rehab    Independent/assisted living    Hospice    Other:       PATIENT CONDITION AT DISCHARGE:     Functional status    Poor     Deconditioned    x Independent      Cognition    x Lucid     Forgetful     Dementia      Catheters/lines (plus indication)    Drake     PICC     PEG    x None      Code status   x  Full code     DNR      PHYSICAL EXAMINATION AT DISCHARGE:   Refer to Progress Note      CHRONIC MEDICAL DIAGNOSES:  Problem List as of 9/18/2022 Date Reviewed: 9/2/2011            Codes Class Noted - Resolved    Nausea and vomiting ICD-10-CM: R11.2  ICD-9-CM: 787.01  9/17/2022 - Present        Vertigo ICD-10-CM: R42  ICD-9-CM: 780.4  9/17/2022 - Present        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

## 2022-09-18 NOTE — PROGRESS NOTES
Bedside shift change report given to Dallas Oliveros (oncoming nurse) by Cortney Franco (offgoing nurse). Report included the following information SBAR, Kardex, Med Rec Status, and Cardiac Rhythm NSR . Patient refused 2200 lipitor. Patient states she does not take this and that her cholesterol levels have always been low. Notes do now show why she has been put on lipitor. Bedside shift change report given to [facility] (oncoming nurse) by Dallas Oliveros (offgoing nurse). Report included the following information SBAR, Kardex, and Cardiac Rhythm NSR .

## 2022-09-18 NOTE — PROGRESS NOTES
OCCUPATIONAL THERAPY EVALUATION/DISCHARGE  Patient: Byron Tanner (4 y.o. female)  Date: 9/18/2022  Primary Diagnosis: Vertigo [R42]       Precautions: Fall    ASSESSMENT  Based on the objective data described below, the patient presents with good safety awareness, no LOB she wasn't able to self correct and at an overall SBA and set-up level with ADLs following admission for vertigo. Pt states dizziness has improved some with the medication and she feels like she can perform her ADLs and functional mobility cautiously and not feel like she is going to fall. Educated pt on keeping head still and eyes focus to decreased dizziness and pt reports that helping. No further skilled OT required at this time. Current Level of Function (ADLs/self-care): SBA and set-up    Functional Outcome Measure: The patient scored 70/100 on the Barthel Index outcome measure. Other factors to consider for discharge: see above     PLAN :  Recommend with staff: up ad [place]    Recommendation for discharge: (in order for the patient to meet his/her long term goals)  No skilled occupational therapy/ follow up rehabilitation needs identified at this time. This discharge recommendation:  Has not yet been discussed the attending provider and/or case management    IF patient discharges home will need the following DME: none       SUBJECTIVE:   Patient stated I feel some better.     OBJECTIVE DATA SUMMARY:   HISTORY:   Past Medical History:   Diagnosis Date    Asthma     Atypical ductal hyperplasia of breast 9/1/2011    Breast lesion 9/1/2011    Hypotension     USUALLY RUNS 90/60.     Unspecified adverse effect of anesthesia 2007    TOOK ALL DAY COMING OUT OF ANESTHESIA /P ABLATION     Past Surgical History:   Procedure Laterality Date    HX DILATION AND CURETTAGE      ENDOMETRIOSIS    HX DILATION AND CURETTAGE  /P DELIVERY    HX GI      COLONOSCOPY    HX GYN  2007    ABLATION       Prior Level of Function/Environment/Context: Independent, active, drives. Cares for her father who is on hospice. Expanded or extensive additional review of patient history:   Home Situation  Home Environment: Private residence  # Steps to Enter: 2  Rails to Enter: Yes  Hand Rails : Bilateral  One/Two Story Residence: One story  Support Systems: Spouse/Significant Other  Patient Expects to be Discharged to[de-identified] Home with family assistance  Current DME Used/Available at Home: None  Tub or Shower Type: Tub/Shower combination    Hand dominance: Right    EXAMINATION OF PERFORMANCE DEFICITS:  Cognitive/Behavioral Status:  Neurologic State: Alert;Eyes open spontaneously; Other (Comment) (dizziness)  Orientation Level: Oriented X4  Cognition: Follows commands  Perception: Appears intact  Perseveration: No perseveration noted  Safety/Judgement: Awareness of environment; Fall prevention;Good awareness of safety precautions; Home safety; Insight into deficits    Hearing: Auditory  Auditory Impairment: None    Vision/Perceptual:    Acuity: Within Defined Limits    Corrective Lenses: Glasses    Range of Motion:  AROM: Within functional limits  PROM: Within functional limits                      Strength:  Strength: Generally decreased, functional                Coordination:  Coordination: Within functional limits  Fine Motor Skills-Upper: Left Intact; Right Intact    Gross Motor Skills-Upper: Left Intact; Right Intact    Tone & Sensation:  Tone: Normal  Sensation: Intact                      Balance:  Sitting: Intact  Standing: Intact; With support    Functional Mobility and Transfers for ADLs:  Bed Mobility:  Rolling: Modified independent  Supine to Sit: Modified independent  Sit to Supine: Modified independent  Scooting: Modified independent    Transfers:  Sit to Stand: Stand-by assistance  Stand to Sit: Stand-by assistance  Bed to Chair: Stand-by assistance  Bathroom Mobility: Stand-by assistance  Toilet Transfer : Stand-by assistance  Assistive Device : Gait Belt    ADL Assessment:  Feeding: Independent    Oral Facial Hygiene/Grooming: Stand-by assistance (standing at sink)    Bathing: Stand-by assistance    Upper Body Dressing: Setup    Lower Body Dressing: Stand-by assistance (crossed leg technique)    Toileting: Stand by assistance     Cognitive Retraining  Safety/Judgement: Awareness of environment; Fall prevention;Good awareness of safety precautions; Home safety; Insight into deficits    Functional Measure:    Barthel Index:  Bathin  Bladder: 10  Bowels: 10  Groomin  Dressin  Feeding: 10  Mobility: 10  Stairs: 5  Toilet Use: 5  Transfer (Bed to Chair and Back): 10  Total: 70/100      The Barthel ADL Index: Guidelines  1. The index should be used as a record of what a patient does, not as a record of what a patient could do. 2. The main aim is to establish degree of independence from any help, physical or verbal, however minor and for whatever reason. 3. The need for supervision renders the patient not independent. 4. A patient's performance should be established using the best available evidence. Asking the patient, friends/relatives and nurses are the usual sources, but direct observation and common sense are also important. However direct testing is not needed. 5. Usually the patient's performance over the preceding 24-48 hours is important, but occasionally longer periods will be relevant. 6. Middle categories imply that the patient supplies over 50 per cent of the effort. 7. Use of aids to be independent is allowed. Score Interpretation (from [address] [facility] 83)    Independent   60-79 Minimally independent   40-59 Partially dependent   20-39 Very dependent   <20 Totally dependent     -Amrit Correa., Barthel, D.W. (1965). Functional evaluation: the Barthel Index. [address] [facility] ([address] [place] [address] (1997). The Barthel activities of daily living index: self-reporting versus actual performance in the old (> or = 75 years).  Journal of American Geriatric Society 45(7), 14 [facility], [facility] HospitalIvonIvon, Nina Hsieh., Haroonthuan Tsang. (). Measuring the change in disability after inpatient rehabilitation; comparison of the responsiveness of the Barthel Index and Functional [place] Measure. Journal of Neurology, Neurosurgery, and Psychiatry, 66(4), 974-265. ANASTASIA Chambers, JOSEPH Ayers, & Gabi Vasquez M.A. (2004) Assessment of post-stroke quality of life in cost-effectiveness studies: The usefulness of the Barthel Index and the EuroQoL-5D. Quality of Life Research, 15, 142-82        Occupational Therapy Evaluation Charge Determination   History Examination Decision-Making   LOW Complexity : Brief history review  LOW Complexity : 1-3 performance deficits relating to physical, cognitive , or psychosocial skils that result in activity limitations and / or participation restrictions  LOW Complexity : No comorbidities that affect functional and no verbal or physical assistance needed to complete eval tasks       Based on the above components, the patient evaluation is determined to be of the following complexity level: LOW   Pain Ratin/10    Activity Tolerance:   Fair and requires rest breaks    After treatment patient left in no apparent distress:    Supine in bed and Call bell within reach    COMMUNICATION/EDUCATION:   The patients plan of care was discussed with: Physical therapist and Registered nurse.      Thank you for this referral.  Roni Tovar OT  Time Calculation: 10 mins

## 2022-09-18 NOTE — PROGRESS NOTES
PHYSICAL THERAPY EVALUATION/DISCHARGE  Patient: Ramez Pollock (2 y.o. female)  Date: 9/18/2022  Primary Diagnosis: Vertigo [R42]       Precautions:   Fall      ASSESSMENT  Based on the objective data described below, the patient presents with full AROM, good strength, generally steady dynamic balance and gait with appropriate activity tolerance generally consistent with her baseline functional mobility level s/p admission for vertigo. Patient endorses symptoms have significantly improvd but still has mild dizziness with position changes and gait. Patient able to self-control symptoms with keeping head in neutral position. No safety concerns with gait and patient aware of need to continue to limit head position changes until symptoms resolve fully. Patient presents with no further needs in the acute setting and is cleared to d/c home from a mobility standpoint. Functional Outcome Measure: The patient scored 70 on the Barthel outcome measure which is indicative of minimal dependence for self care and mobility. Other factors to consider for discharge: supportive      Further skilled acute physical therapy is not indicated at this time. PLAN :  Recommendation for discharge: (in order for the patient to meet his/her long term goals)  No skilled physical therapy/ follow up rehabilitation needs identified at this time. This discharge recommendation:  Has been made in collaboration with the attending provider and/or case management    IF patient discharges home will need the following DME: none       SUBJECTIVE:   Patient stated It's much much better today but I'm still careful.     OBJECTIVE DATA SUMMARY:   HISTORY:    Past Medical History:   Diagnosis Date    Asthma     Atypical ductal hyperplasia of breast 9/1/2011    Breast lesion 9/1/2011    Hypotension     USUALLY RUNS 90/60.     Unspecified adverse effect of anesthesia 2007    TOOK ALL DAY COMING OUT OF ANESTHESIA /P ABLATION     Past Surgical History:   Procedure Laterality Date    HX DILATION AND CURETTAGE      ENDOMETRIOSIS    HX DILATION AND CURETTAGE  /P DELIVERY    HX GI      COLONOSCOPY    HX GYN  2007    ABLATION       Prior level of function: indep and active  Personal factors and/or comorbidities impacting plan of care:     Home Situation  Home Environment: Private residence  # Steps to Enter: 2  Rails to Enter: Yes  Hand Rails : Bilateral  One/Two Story Residence: One story  Support Systems: Spouse/Significant Other  Patient Expects to be Discharged to[de-identified] Home with family assistance  Current DME Used/Available at Home: None  Tub or Shower Type: Tub/Shower combination    EXAMINATION/PRESENTATION/DECISION MAKING:   Critical Behavior:  Neurologic State: Alert, Eyes open spontaneously, Other (Comment) (dizziness)  Orientation Level: Oriented X4  Cognition: Follows commands  Safety/Judgement: Awareness of environment, Fall prevention, Good awareness of safety precautions, Home safety, Insight into deficits  Hearing: Auditory  Auditory Impairment: None    Range Of Motion:  AROM: Within functional limits           PROM: Within functional limits           Strength:    Strength: Generally decreased, functional                    Tone & Sensation:   Tone: Normal              Sensation: Intact               Coordination:  Coordination: Within functional limits  Vision:   Acuity: Within Defined Limits  Corrective Lenses: Glasses  Functional Mobility:  Bed Mobility:  Rolling: Modified independent  Supine to Sit: Modified independent  Sit to Supine: Modified independent  Scooting: Modified independent  Transfers:  Sit to Stand: Stand-by assistance  Stand to Sit: Stand-by assistance        Bed to Chair: Stand-by assistance              Balance:   Sitting: Intact  Standing: Intact; With support  Ambulation/Gait Training:  Distance (ft): 150 Feet (ft)  Assistive Device: Gait belt  Ambulation - Level of Assistance: Stand-by assistance        Gait Abnormalities: Altered arm swing (no head turns)              Speed/Anna: Slow  Step Length: Right shortened;Left shortened                    Functional Measure:  Barthel Index:    Bathin  Bladder: 10  Bowels: 10  Groomin  Dressin  Feeding: 10  Mobility: 10  Stairs: 5  Toilet Use: 5  Transfer (Bed to Chair and Back): 10  Total: 70/100       The Barthel ADL Index: Guidelines  1. The index should be used as a record of what a patient does, not as a record of what a patient could do. 2. The main aim is to establish degree of independence from any help, physical or verbal, however minor and for whatever reason. 3. The need for supervision renders the patient not independent. 4. A patient's performance should be established using the best available evidence. Asking the patient, friends/relatives and nurses are the usual sources, but direct observation and common sense are also important. However direct testing is not needed. 5. Usually the patient's performance over the preceding 24-48 hours is important, but occasionally longer periods will be relevant. 6. Middle categories imply that the patient supplies over 50 per cent of the effort. 7. Use of aids to be independent is allowed. Score Interpretation (from [address] [place] [address])    Independent   60-79 Minimally independent   40-59 Partially dependent   20-39 Very dependent   <20 Totally dependent     -Amrit Correa., Barthel, D.W. (1965). Functional evaluation: the Barthel Index. [address] [facility] ([address] (1997). The Barthel activities of daily living index: self-reporting versus actual performance in the old (> or = 75 years). Journal of [address] [place] [address] 45(7), 14 [facility], JEFFRY, Jared Verma., Carmina Mullins. (1999). Measuring the change in disability after inpatient rehabilitation; comparison of the responsiveness of the Barthel Index and Functional [place] Measure.  Journal of Neurology, Neurosurgery, and Psychiatry, 66(4), 262-263. ANASTASIA Kurtz, JOSEPH Ayers, & Ruth Sommer M.A. (2004) Assessment of post-stroke quality of life in cost-effectiveness studies: The usefulness of the Barthel Index and the EuroQoL-5D. Quality of Life Research, 15, 274-57            Physical Therapy Evaluation Charge Determination   History Examination Presentation Decision-Making   MEDIUM  Complexity : 1-2 comorbidities / personal factors will impact the outcome/ POC  LOW Complexity : 1-2 Standardized tests and measures addressing body structure, function, activity limitation and / or participation in recreation  LOW Complexity : Stable, uncomplicated  Other outcome measures Barthel 70  LOW       Based on the above components, the patient evaluation is determined to be of the following complexity level: LOW     Pain Rating:  None    Activity Tolerance:   Good      After treatment patient left in no apparent distress:   Supine in bed and Call bell within reach    COMMUNICATION/EDUCATION:   The patients plan of care was discussed with: Occupational therapist and Registered nurse. Fall prevention education was provided and the patient/caregiver indicated understanding.     Thank you for this referral.  Jennifer Ragland, PT   Time Calculation: 10 mins

## 2022-09-18 NOTE — CONSULTS
NEUROLOGY CONSULT NOTE    Patient ID:  Mathew Greenberg  [id number]  50 y.o.  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 Unspecified adverse effect of anesthesia 2007    TOOK ALL DAY COMING OUT OF ANESTHESIA /P ABLATION      Past Surgical History:   Procedure Laterality Date    HX DILATION AND CURETTAGE      ENDOMETRIOSIS    HX DILATION AND CURETTAGE  /P DELIVERY    HX GI      COLONOSCOPY    HX GYN  2007    ABLATION      Prior to Admission medications    Medication Sig Start Date End Date Taking? Authorizing Provider   hydrOXYchloroQUINE (PlaqueniL) 200 mg tablet Take 200 mg by mouth two (2) times a day. Indications: rheumatoid arthritis   Yes Provider, Historical   meloxicam (MOBIC) 15 mg tablet Take 15 mg by mouth daily. Indications: rheumatoid arthritis   Yes Provider, Historical   MULTIVITAMIN PO Take  by mouth daily. Yes Provider, Historical   LYSINE, BULK, by Does Not Apply route daily. Indications: MOUTH ULCERS   Yes Provider, Historical   CALCIUM CARBONATE/VITAMIN D3 (CALCIUM + D PO) Take  by mouth. Yes Provider, Historical   hydrocodone-acetaminophen (VICODIN) 5-500 mg per tablet Take 1 Tab by mouth every four (4) hours as needed for Pain. Patient not taking: Reported on 9/17/2022 9/2/11   Melanie Dos Santos NP   hydrocodone-acetaminophen (VICODIN) 5-500 mg per tablet Take 1 Tab by mouth every three (3) hours as needed for Pain.   Patient not taking: Reported on 9/17/2022 11   Deborah Pizarro MD   cetirizine (ZYRTEC) 10 mg tablet Take  by mouth daily. Indications: ASTHMA, ALLERGY  Patient not taking: Reported on 2022    Provider, Historical   albuterol (PROVENTIL HFA, VENTOLIN HFA, PROAIR HFA) 90 mcg/actuation inhaler Take 1 Puff by inhalation every six (6) hours as needed. Provider, Historical   budesonide (RHINOCORT AQUA) 32 mcg/actuation nasal spray by Nasal route as needed. Patient not taking: Reported on 2022    Provider, Historical   DOCOSAHEXANOIC ACID/EPA (FISH OIL PO) Take  by mouth daily. Patient not taking: Reported on 2022    Provider, Historical   GLUCOSAM SUL NA/CHONDR LINDQUIST A NA (GLUCOSAMINE-CHONDROITIN 3X PO) Take  by mouth daily. Patient not taking: Reported on 2022    Provider, Historical     No Known Allergies   Social History     Tobacco Use    Smoking status: Never    Smokeless tobacco: Not on file   Substance Use Topics    Alcohol use: No      Family History   Problem Relation Age of Onset    Other Mother          OF LATENT HEP C FROM BLOOD TRANSFUSION    Cancer Father         THROAT & Kamryn Ran Sister          11 OF COLON CA / OVARIAN        Subjective:      Eli Lopez is a 73 y.o. female with history of asthma and rheumatoid arthritis who was admitted from the ER for vertigo. Patient woke up early morning day of admission and had an abrupt onset of vertigo associate with nausea and vomiting. Head movements or changes in position would trigger the issue. Also noted wobbliness and generalized weakness. EMS was called and patient is brought to the ER. In the ER blood pressure was 132/54. NIH stroke scale was 0. Labs revealed unremarkable glucose, PT, INR, CMP, CBC and troponin. EKG revealed normal sinus rhythm. Head CT without contrast was essentially normal.  Head and neck CTA with contrast revealed no flow-limiting stenosis or aneurysm. No ICA stenosis. CT perfusion study was negative. There is reversal of cervical lordosis with multilevel degenerative disc disease most advanced at C5-6 and C6-7. Brain MRI with and without contrast was essentially normal.  No abnormal enhancements. Chest x-ray did not reveal any acute findings. When seen, patient reports as long she does not move her head or change positions she feels okay. Still having some issues with dizziness and mild vertigo but not as intense as it was. Patient was actually able to stand and walk. Outside reports reviewed: ER records, radiology reports, lab reports. Review of Systems:    A comprehensive review of systems was done and were negative except for those mentioned in the HPI. Objective:     Patient Vitals for the past 8 hrs:   BP Temp Pulse Resp SpO2 Weight   09/18/22 0743 125/60 (!) 95.4 °F (35.2 °C) 67 14 99 % --   09/18/22 0700 -- -- (!) 52 -- -- --   09/18/22 0302 (!) 86/52 97.8 °F (36.6 °C) 60 11 97 % 70.2 kg (154 lb 12.2 oz)     PHYSICAL EXAM:    NEUROLOGICAL EXAM:    Appearance: The patient is well developed, well nourished, provides a coherent history and is in no acute distress. Mental Status: Oriented to time, place and person. Fluent, no aphasia or dysarthria. Good recent and remote memory. Good attention and concentration. Able to name objects. Good repetition. Adequate fund of knowledge. Mood and affect appropriate. Cranial Nerves:   Intact visual fields. CAROLINE, EOM's full, no nystagmus, no ptosis. Facial sensation is normal. Corneal reflexes are intact. Facial movement is symmetric. Hearing is normal bilaterally. Palate is midline with normal elevation. Sternocleidomastoid and trapezius muscles are normal. Tongue is midline. Motor:  5/5 strength in upper and lower proximal and distal muscles. Normal bulk and tone. No fasciculations. No pronator drift. Reflexes:   Deep tendon reflexes 2+/4 and symmetrical. Downgoing toes. Sensory:   Normal to cold and vibration. Gait:  Normal gait.  No Romberg. Tremor:   No tremor noted. Cerebellar:  Intact FTN/GAURAV/HTS. Neurovascular:  Normal heart sounds and regular rhythm, peripheral pulses intact, and no carotid bruits. Imaging  CT Head, head/neck CTA, brain MRI: reviewed    Lab Review    Recent Results (from the past 24 hour(s))   GLUCOSE, POC    Collection Time: 09/17/22 10:17 AM   Result Value Ref Range    Glucose (POC) 92 65 - 117 mg/dL    Performed by Sanchez HUNT (Tech)    CBC WITH AUTOMATED DIFF    Collection Time: 09/17/22 10:20 AM   Result Value Ref Range    WBC 4.8 3.6 - 11.0 K/uL    RBC 3.93 3.80 - 5.20 M/uL    HGB 12.0 11.5 - 16.0 g/dL    HCT 36.3 35.0 - 47.0 %    MCV 92.4 80.0 - 99.0 FL    MCH 30.5 26.0 - 34.0 PG    MCHC 33.1 30.0 - 36.5 g/dL    RDW 12.5 11.5 - 14.5 %    PLATELET 161 742 - 560 K/uL    MPV 9.4 8.9 - 12.9 FL    NRBC 0.0 0  WBC    ABSOLUTE NRBC 0.00 0.00 - 0.01 K/uL    NEUTROPHILS 76 (H) 32 - 75 %    LYMPHOCYTES 14 12 - 49 %    MONOCYTES 7 5 - 13 %    EOSINOPHILS 2 0 - 7 %    BASOPHILS 1 0 - 1 %    IMMATURE GRANULOCYTES 0 0.0 - 0.5 %    ABS. NEUTROPHILS 3.7 1.8 - 8.0 K/UL    ABS. LYMPHOCYTES 0.7 (L) 0.8 - 3.5 K/UL    ABS. MONOCYTES 0.3 0.0 - 1.0 K/UL    ABS. EOSINOPHILS 0.1 0.0 - 0.4 K/UL    ABS. BASOPHILS 0.0 0.0 - 0.1 K/UL    ABS. IMM.  GRANS. 0.0 0.00 - 0.04 K/UL    DF SMEAR SCANNED      RBC COMMENTS NORMOCYTIC, NORMOCHROMIC     METABOLIC PANEL, COMPREHENSIVE    Collection Time: 09/17/22 10:20 AM   Result Value Ref Range    Sodium 139 136 - 145 mmol/L    Potassium 3.8 3.5 - 5.1 mmol/L    Chloride 108 97 - 108 mmol/L    CO2 29 21 - 32 mmol/L    Anion gap 2 (L) 5 - 15 mmol/L    Glucose 122 (H) 65 - 100 mg/dL    BUN 12 6 - 20 MG/DL    Creatinine 0.92 0.55 - 1.02 MG/DL    BUN/Creatinine ratio 13 12 - 20      GFR est AA >60 >60 ml/min/1.73m2    GFR est non-AA >60 >60 ml/min/1.73m2    Calcium 9.0 8.5 - 10.1 MG/DL    Bilirubin, total 0.4 0.2 - 1.0 MG/DL    ALT (SGPT) 24 12 - 78 U/L    AST (SGOT) 24 15 - 37 U/L Alk. phosphatase 86 45 - 117 U/L    Protein, total 7.0 6.4 - 8.2 g/dL    Albumin 3.8 3.5 - 5.0 g/dL    Globulin 3.2 2.0 - 4.0 g/dL    A-G Ratio 1.2 1.1 - 2.2     PROTHROMBIN TIME + INR    Collection Time: 09/17/22 10:20 AM   Result Value Ref Range    INR 1.0 0.9 - 1.1      Prothrombin time 10.8 9.0 - 11.1 sec   TROPONIN-HIGH SENSITIVITY    Collection Time: 09/17/22 10:20 AM   Result Value Ref Range    Troponin-High Sensitivity 5 0 - 51 ng/L   SAMPLES BEING HELD    Collection Time: 09/17/22 10:20 AM   Result Value Ref Range    SAMPLES BEING HELD 1SST,1RED,2 BLOOD CULTURE BOTTLES     COMMENT        Add-on orders for these samples will be processed based on acceptable specimen integrity and analyte stability, which may vary by analyte. EKG, 12 LEAD, INITIAL    Collection Time: 09/17/22 10:55 AM   Result Value Ref Range    Ventricular Rate 64 BPM    Atrial Rate 64 BPM    P-R Interval 208 ms    QRS Duration 80 ms    Q-T Interval 442 ms    QTC Calculation (Bezet) 455 ms    Calculated P Axis 81 degrees    Calculated R Axis 59 degrees    Calculated T Axis 84 degrees    Diagnosis       Normal sinus rhythm  Normal ECG  When compared with ECG of 23-AUG-2011 15:57,  QT has lengthened  Confirmed by Sam Cottrell MD, Zonia Morfin ([id number]) on 9/17/2022 3:14:00 PM           Assessment:   Peripheral vertigo    Plan:   Neurological examination is nonfocal and no correlate with subjective complaint. There is no evidence of any brainstem or cerebellar deficits. Event is more consistent with benign positional vertigo likely secondary to some issues such as labyrinthitis and etc. Not consistent with a TIA or stroke as condition is still lingering despite negative work-up. Head CT without contrast was essentially normal.  Brain MRI with and without contrast was essentially normal.  No abnormal enhancements. Head and neck CTA did not reveal any flow-limiting stenosis or aneurysm. No ICA stenosis.     Recommend symptomatic treatment of vertigo with as needed meclizine and if necessary short course of steroids. If condition persist in the outpatient and recommend ENT evaluation and vestibular rehab. No further recommendations from a neurological standpoint. Patient is okay to be discharged. Thank you for the consult. This note was created using voice recognition software. Despite editing, there may be syntax errors.

## 2022-09-18 NOTE — DISCHARGE INSTRUCTIONS
Discharge Instructions       PATIENT ID: Norma Mills  MRN: [id number]   YOB: 1955    DATE OF ADMISSION: [unfilled]    DATE OF DISCHARGE: 9/18/2022    PRIMARY CARE PROVIDER: @PCP@       DISCHARGING PHYSICIAN: Kera Jones MD    To contact this individual call [phone] and ask the  to page. If unavailable ask to be transferred the Adult Hospitalist Department. DISCHARGE DIAGNOSES: Dizziness 2/2 BPPV    CONSULTATIONS: [unfilled]    PROCEDURES/SURGERIES: * No surgery found *    PENDING TEST RESULTS:   At the time of discharge the following test results are still pending:     FOLLOW UP APPOINTMENTS:   [unfilled]     ADDITIONAL CARE RECOMMENDATIONS: Follow up with ENT if dizziness re-occurs    DIET: Regular Diet    ACTIVITY: Activity as tolerated    WOUND CARE:     EQUIPMENT needed:       DISCHARGE MEDICATIONS:   See Medication Reconciliation Form    It is important that you take the medication exactly as they are prescribed. Keep your medication in the bottles provided by the pharmacist and keep a list of the medication names, dosages, and times to be taken in your wallet. Do not take other medications without consulting your doctor. NOTIFY YOUR PHYSICIAN FOR ANY OF THE FOLLOWING:   Fever over 101 degrees for 24 hours. Chest pain, shortness of breath, fever, chills, nausea, vomiting, diarrhea, change in mentation, falling, weakness, bleeding. Severe pain or pain not relieved by medications. Or, any other signs or symptoms that you may have questions about.       DISPOSITION:   x Home With:   OT  PT  HH  RN       SNF/Inpatient Rehab/LTAC    Independent/assisted living    Hospice    Other:     CDMP Checked:   Yes x       Signed:   Kera Jones MD  9/18/2022  10:17 AM

## 2022-10-17 PROBLEM — E86.0 DEHYDRATION: Status: RESOLVED | Noted: 2022-09-17 | Resolved: 2022-10-17

## 2024-04-22 RX ORDER — ACETAMINOPHEN 160 MG
2000 TABLET,DISINTEGRATING ORAL DAILY
COMMUNITY

## 2024-04-22 RX ORDER — PHENOL 1.4 %
1 AEROSOL, SPRAY (ML) MUCOUS MEMBRANE EVERY OTHER DAY
COMMUNITY

## 2024-04-22 NOTE — PERIOP NOTE
[facility]                   [address] [place] [address]   MAIN OR                                  [phone]   MAIN PRE OP           [phone]                                                                                AMBULATORY PRE OP          [phone]  PRE-ADMISSION TESTING    [phone]   Surgery Date:  tomorrow 4/23       Is surgery arrival time given by surgeon?  YES  NO  If “NO”, [facility] staff will call you between 3 and 7pm the day before your surgery with your arrival time. (If your surgery is on a Monday, we will call you the Friday before.)    Call [phone] after 7pm Monday-Friday if you did not receive this call.    INSTRUCTIONS BEFORE YOUR SURGERY   When You  Arrive Arrive at the 2nd Floor Admitting Desk on the day of your surgery  Have your insurance card, photo ID,living will/advanced directive/POA (if applicable),  and any copayment (if needed)   Food   and   Drink NO food or drink after midnight the night before surgery    This means NO water, gum, mints, coffee, juice, etc.  No alcohol (beer, wine, liquor) or marijuana 24 hours before and after surgery   Medications to   TAKE   Morning of Surgery MEDICATIONS TO TAKE THE MORNING OF SURGERY WITH A SIP OF WATER:   Zyrtec  Albuterol Inhaler if needed and bring with you  Hydroxychloroquine     Medications  To  STOP      7 days before surgery Non-Steroidal anti-inflammatory Drugs (NSAID's): for example, Ibuprofen (Advil, Motrin), Naproxen (Aleve)  Aspirin, if taking for pain   Herbal supplements, vitamins, and fish oil  Other:  (Pain medications not listed above, including Tylenol may be taken)       Bathing Clothing  Jewelry  Valuables     If you shower the morning of surgery, please do not apply anything to your skin (lotions, powders, deodorant, or makeup, especially mascara)  Follow Chlorhexidine Care Fusion body wash instructions provided to you during PAT appointment. Begin 3 days  DISPLAY PLAN FREE TEXT DISPLAY PLAN FREE TEXT DISPLAY PLAN FREE TEXT DISPLAY PLAN FREE TEXT DISPLAY PLAN FREE TEXT DISPLAY PLAN FREE TEXT DISPLAY PLAN FREE TEXT DISPLAY PLAN FREE TEXT

## 2024-04-23 ENCOUNTER — ANESTHESIA (OUTPATIENT)
Facility: HOSPITAL | Age: 69
End: 2024-04-23
Payer: MEDICARE

## 2024-04-23 ENCOUNTER — ANESTHESIA EVENT (OUTPATIENT)
Facility: HOSPITAL | Age: 69
End: 2024-04-23
Payer: MEDICARE

## 2024-04-23 ENCOUNTER — HOSPITAL ENCOUNTER (OUTPATIENT)
Facility: HOSPITAL | Age: 69
Setting detail: OUTPATIENT SURGERY
Discharge: HOME | End: 2024-04-23
Attending: ORTHOPAEDIC SURGERY | Admitting: ORTHOPAEDIC SURGERY
Payer: MEDICARE

## 2024-04-23 VITALS
DIASTOLIC BLOOD PRESSURE: 48 MMHG | HEART RATE: 70 BPM | BODY MASS INDEX: 25.97 KG/M2 | OXYGEN SATURATION: 100 % | HEIGHT: 66 IN | TEMPERATURE: 98.1 F | RESPIRATION RATE: 22 BRPM | WEIGHT: 161.6 LBS | SYSTOLIC BLOOD PRESSURE: 102 MMHG

## 2024-04-23 PROCEDURE — 3600000004 HC SURGERY LEVEL 4 BASE: Performed by: ORTHOPAEDIC SURGERY

## 2024-04-23 PROCEDURE — 7100000011 HC PHASE II RECOVERY - ADDTL 15 MIN: Performed by: ORTHOPAEDIC SURGERY

## 2024-04-23 PROCEDURE — 7100000010 HC PHASE II RECOVERY - FIRST 15 MIN: Performed by: ORTHOPAEDIC SURGERY

## 2024-04-23 PROCEDURE — 6360000002 HC RX W HCPCS: Performed by: NURSE ANESTHETIST, CERTIFIED REGISTERED

## 2024-04-23 PROCEDURE — C1713 ANCHOR/SCREW BN/BN,TIS/BN: HCPCS | Performed by: ORTHOPAEDIC SURGERY

## 2024-04-23 PROCEDURE — 2580000003 HC RX 258: Performed by: STUDENT IN AN ORGANIZED HEALTH CARE EDUCATION/TRAINING PROGRAM

## 2024-04-23 PROCEDURE — 3700000001 HC ADD 15 MINUTES (ANESTHESIA): Performed by: ORTHOPAEDIC SURGERY

## 2024-04-23 PROCEDURE — 6360000002 HC RX W HCPCS: Performed by: ANESTHESIOLOGY

## 2024-04-23 PROCEDURE — 2709999900 HC NON-CHARGEABLE SUPPLY: Performed by: ORTHOPAEDIC SURGERY

## 2024-04-23 PROCEDURE — 2580000003 HC RX 258: Performed by: ORTHOPAEDIC SURGERY

## 2024-04-23 PROCEDURE — 3700000000 HC ANESTHESIA ATTENDED CARE: Performed by: ORTHOPAEDIC SURGERY

## 2024-04-23 PROCEDURE — 3600000014 HC SURGERY LEVEL 4 ADDTL 15MIN: Performed by: ORTHOPAEDIC SURGERY

## 2024-04-23 PROCEDURE — 6360000002 HC RX W HCPCS: Performed by: ORTHOPAEDIC SURGERY

## 2024-04-23 PROCEDURE — 2500000003 HC RX 250 WO HCPCS: Performed by: NURSE ANESTHETIST, CERTIFIED REGISTERED

## 2024-04-23 PROCEDURE — 64415 NJX AA&/STRD BRCH PLXS IMG: CPT | Performed by: ANESTHESIOLOGY

## 2024-04-23 DEVICE — IMPLANTABLE DEVICE: Type: IMPLANTABLE DEVICE | Site: HAND | Status: FUNCTIONAL

## 2024-04-23 RX ORDER — PROPOFOL 10 MG/ML
INJECTION, EMULSION INTRAVENOUS PRN
Status: DISCONTINUED | OUTPATIENT
Start: 2024-04-23 | End: 2024-04-23 | Stop reason: SDUPTHER

## 2024-04-23 RX ORDER — NALOXONE HYDROCHLORIDE 0.4 MG/ML
INJECTION, SOLUTION INTRAMUSCULAR; INTRAVENOUS; SUBCUTANEOUS PRN
Status: DISCONTINUED | OUTPATIENT
Start: 2024-04-23 | End: 2024-04-23 | Stop reason: HOSPADM

## 2024-04-23 RX ORDER — LIDOCAINE HYDROCHLORIDE 10 MG/ML
1 INJECTION, SOLUTION EPIDURAL; INFILTRATION; INTRACAUDAL; PERINEURAL
Status: DISCONTINUED | OUTPATIENT
Start: 2024-04-23 | End: 2024-04-23 | Stop reason: HOSPADM

## 2024-04-23 RX ORDER — FENTANYL CITRATE 50 UG/ML
100 INJECTION, SOLUTION INTRAMUSCULAR; INTRAVENOUS
Status: DISCONTINUED | OUTPATIENT
Start: 2024-04-23 | End: 2024-04-23 | Stop reason: HOSPADM

## 2024-04-23 RX ORDER — MIDAZOLAM HYDROCHLORIDE 2 MG/2ML
2 INJECTION, SOLUTION INTRAMUSCULAR; INTRAVENOUS
Status: DISCONTINUED | OUTPATIENT
Start: 2024-04-23 | End: 2024-04-23 | Stop reason: HOSPADM

## 2024-04-23 RX ORDER — EPHEDRINE SULFATE/0.9% NACL/PF 25 MG/5 ML
SYRINGE (ML) INTRAVENOUS PRN
Status: DISCONTINUED | OUTPATIENT
Start: 2024-04-23 | End: 2024-04-23 | Stop reason: SDUPTHER

## 2024-04-23 RX ORDER — SODIUM CHLORIDE, SODIUM LACTATE, POTASSIUM CHLORIDE, CALCIUM CHLORIDE 600; 310; 30; 20 MG/100ML; MG/100ML; MG/100ML; MG/100ML
INJECTION, SOLUTION INTRAVENOUS CONTINUOUS
Status: DISCONTINUED | OUTPATIENT
Start: 2024-04-23 | End: 2024-04-23 | Stop reason: HOSPADM

## 2024-04-23 RX ORDER — DIPHENHYDRAMINE HYDROCHLORIDE 50 MG/ML
12.5 INJECTION INTRAMUSCULAR; INTRAVENOUS
Status: DISCONTINUED | OUTPATIENT
Start: 2024-04-23 | End: 2024-04-23 | Stop reason: HOSPADM

## 2024-04-23 RX ORDER — MIDAZOLAM HYDROCHLORIDE 1 MG/ML
INJECTION INTRAMUSCULAR; INTRAVENOUS PRN
Status: DISCONTINUED | OUTPATIENT
Start: 2024-04-23 | End: 2024-04-23 | Stop reason: SDUPTHER

## 2024-04-23 RX ORDER — FLUTICASONE PROPIONATE 50 MCG
1 SPRAY, SUSPENSION (ML) NASAL DAILY
COMMUNITY

## 2024-04-23 RX ORDER — FENTANYL CITRATE 50 UG/ML
INJECTION, SOLUTION INTRAMUSCULAR; INTRAVENOUS PRN
Status: DISCONTINUED | OUTPATIENT
Start: 2024-04-23 | End: 2024-04-23 | Stop reason: SDUPTHER

## 2024-04-23 RX ORDER — ROPIVACAINE HYDROCHLORIDE 5 MG/ML
INJECTION, SOLUTION EPIDURAL; INFILTRATION; PERINEURAL PRN
Status: DISCONTINUED | OUTPATIENT
Start: 2024-04-23 | End: 2024-04-23 | Stop reason: SDUPTHER

## 2024-04-23 RX ORDER — PHENYLEPHRINE HCL IN 0.9% NACL 0.4MG/10ML
SYRINGE (ML) INTRAVENOUS PRN
Status: DISCONTINUED | OUTPATIENT
Start: 2024-04-23 | End: 2024-04-23 | Stop reason: SDUPTHER

## 2024-04-23 RX ORDER — ONDANSETRON 2 MG/ML
4 INJECTION INTRAMUSCULAR; INTRAVENOUS
Status: DISCONTINUED | OUTPATIENT
Start: 2024-04-23 | End: 2024-04-23 | Stop reason: HOSPADM

## 2024-04-23 RX ADMIN — FENTANYL CITRATE 100 MCG: 50 INJECTION, SOLUTION INTRAMUSCULAR; INTRAVENOUS at 09:34

## 2024-04-23 RX ADMIN — Medication 80 MCG: at 11:05

## 2024-04-23 RX ADMIN — Medication 40 MCG: at 11:01

## 2024-04-23 RX ADMIN — Medication 40 MCG: at 10:53

## 2024-04-23 RX ADMIN — SODIUM CHLORIDE, POTASSIUM CHLORIDE, SODIUM LACTATE AND CALCIUM CHLORIDE: 600; 310; 30; 20 INJECTION, SOLUTION INTRAVENOUS at 10:10

## 2024-04-23 RX ADMIN — WATER 2000 MG: 1 INJECTION INTRAMUSCULAR; INTRAVENOUS; SUBCUTANEOUS at 10:23

## 2024-04-23 RX ADMIN — Medication 40 MCG: at 10:58

## 2024-04-23 RX ADMIN — Medication 80 MCG: at 10:48

## 2024-04-23 RX ADMIN — Medication 80 MCG: at 11:13

## 2024-04-23 RX ADMIN — PROPOFOL 40 MG: 10 INJECTION, EMULSION INTRAVENOUS at 10:19

## 2024-04-23 RX ADMIN — MIDAZOLAM HYDROCHLORIDE 2 MG: 1 INJECTION, SOLUTION INTRAMUSCULAR; INTRAVENOUS at 09:34

## 2024-04-23 RX ADMIN — ROPIVACAINE HYDROCHLORIDE 25 ML: 5 INJECTION, SOLUTION EPIDURAL; INFILTRATION; PERINEURAL at 09:41

## 2024-04-23 RX ADMIN — EPHEDRINE SULFATE 10 MG: 5 INJECTION INTRAVENOUS at 10:26

## 2024-04-23 RX ADMIN — PROPOFOL 50 MCG/KG/MIN: 10 INJECTION, EMULSION INTRAVENOUS at 10:20

## 2024-04-23 ASSESSMENT — PAIN - FUNCTIONAL ASSESSMENT: PAIN_FUNCTIONAL_ASSESSMENT: 0-10

## 2024-04-23 NOTE — ANESTHESIA POSTPROCEDURE EVALUATION
Department of Anesthesiology  Postprocedure Note    Patient: Sara Blunt  MRN: [id number]  YOB: 1955  Date of evaluation: 4/23/2024    Procedure Summary       Date: 04/23/24 Room / Location: [facility] OR [address] AMBULATORY OR    Anesthesia Start: 1013 Anesthesia Stop: 1137    Procedure: LEFT 5TH METACARPAL NECK FRACTURE OPEN REDUCTION INTERNAL FIXATION (MAC/REG) (Left: Hand) Diagnosis:       Closed displaced fracture of neck of fifth metacarpal bone of left hand, initial encounter      (Closed displaced fracture of neck of fifth metacarpal bone of left hand, initial encounter [S62.337A])    Surgeons: Rex Earl MD Responsible Provider: Morris Briggs MD    Anesthesia Type: MAC, regional ASA Status: 2            Anesthesia Type: No value filed.    Elida Phase I: Elida Score: 9    Elida Phase II:      Anesthesia Post Evaluation    Patient location during evaluation: PACU  Patient participation: complete - patient participated  Level of consciousness: awake  Pain score: 0  Airway patency: patent  Nausea & Vomiting: no nausea and no vomiting  Cardiovascular status: blood pressure returned to baseline  Respiratory status: acceptable  Hydration status: euvolemic  Multimodal analgesia pain management approach  Pain management: adequate    No notable events documented.

## 2024-04-23 NOTE — ANESTHESIA PRE PROCEDURE
Department of Anesthesiology  Preprocedure Note       Name:  Sara Blunt   Age:  68 y.o.  :  1955                                          MRN:  [id number]         Date:  2024      Surgeon: Surgeon(s):  Rex Earl MD    Procedure: Procedure(s):  LEFT 5TH METACARPAL NECK FRACTURE OPEN REDUCTION INTERNAL FIXATION (MAC/REG)    Medications prior to admission:   Prior to Admission medications    Medication Sig Start Date End Date Taking? Authorizing Provider   fluticasone (FLONASE) 50 MCG/ACT nasal spray 1 spray by Each Nostril route daily   Yes Octavio Christensen MD   TURMERIC PO Take by mouth daily   Yes Octavio Christensen MD   calcium carbonate 600 MG TABS tablet Take 1 tablet by mouth every other day   Yes Octavio Christensen MD   Probiotic Product (PROBIOTIC BLEND PO) Take by mouth every other day   Yes Octavio Christensen MD   vitamin D (VITAMIN D3) 50 MCG (2000 UT) CAPS capsule Take 1 capsule by mouth daily   Yes Octavio Christensen MD   Lysine 1000 MG TABS Take by mouth daily   Yes Octavio Christensen MD   Methylcobalamin 2500 MCG SUBL Place under the tongue daily   Yes Octavio Christensen MD   Multiple Vitamin (MULTIVITAMIN PO) Take by mouth daily    Automatic Reconciliation, Ar   albuterol sulfate HFA (PROVENTIL;VENTOLIN;PROAIR) 108 (90 Base) MCG/ACT inhaler Inhale 1 puff into the lungs every 6 hours as needed    Automatic Reconciliation, Ar   cetirizine (ZYRTEC) 10 MG tablet Take by mouth daily    Automatic Reconciliation, Ar   hydroxychloroquine (PLAQUENIL) 200 MG tablet Take 1 tablet by mouth 2 times daily    Automatic Reconciliation, Ar   meloxicam (MOBIC) 15 MG tablet Take 1 tablet by mouth daily    Automatic Reconciliation, Ar       Current medications:    Current Facility-Administered Medications   Medication Dose Route Frequency Provider Last Rate Last Admin    lidocaine PF 1 % injection 1 mL  1 mL IntraDERmal Once PRN Scott Logan MD        [id number]

## 2024-04-23 NOTE — BRIEF OP NOTE
Brief Postoperative Note      Patient: Sara Blunt  YOB: 1955  MRN: [id number]    Date of Procedure: 4/23/2024    Pre-Op Diagnosis Codes:     * Closed displaced fracture of neck of fifth metacarpal bone of left hand, initial encounter [S62.337A]    Post-Op Diagnosis: Same       Procedure(s):  LEFT 5TH METACARPAL NECK FRACTURE OPEN REDUCTION INTERNAL FIXATION (MAC/REG)    Surgeon(s):  Rex Earl MD    Assistant:  Physician Assistant: Kellee Samaniego PA    Anesthesia: Monitor Anesthesia Care    Estimated Blood Loss (mL): Minimal    Complications: None    Specimens:   * No specimens in log *    Implants:  Implant Name Type Inv. Item Serial No.  Lot No. LRB No. Used Action   SCREW BNE L13MM DIA1.3MM MEL HND S STL ST T4 STARDRV - SNA  SCREW BNE L13MM DIA1.3MM MEL HND S STL ST T4 [employer]ARDRV NA [employer] [place]-[employer] NA Left 1 Implanted         Drains: * No LDAs found *    Findings:  Infection Present At Time Of Surgery (PATOS) (choose all levels that have infection present):  No infection present  Other Findings: Left 5th metacarpal fracture     Electronically signed by SILVANA Hollis on 4/23/2024 at 11:41 AM

## 2024-04-23 NOTE — ANESTHESIA PROCEDURE NOTES
Peripheral Block    Patient location during procedure: pre-op  Reason for block: procedure for pain, post-op pain management, primary anesthetic and at surgeon's request  Start time: 4/23/2024 9:34 AM  End time: 4/23/2024 9:41 AM  Staffing  Performed: anesthesiologist   Anesthesiologist: Morris Briggs MD  Performed by: Morris Briggs MD  Authorized by: Morris Briggs MD    Preanesthetic Checklist  Completed: patient identified, IV checked, site marked, risks and benefits discussed, surgical/procedural consents, pre-op evaluation, timeout performed, anesthesia consent given, oxygen available and monitors applied/VS acknowledged  Peripheral Block   Patient position: supine  Prep: ChloraPrep  Provider prep: mask and sterile gloves  Patient monitoring: cardiac monitor, continuous pulse ox, continuous capnometry, frequent blood pressure checks, IV access, oxygen and responsive to questions  Block type: Brachial plexus  Supraclavicular  Laterality: left  Injection technique: single-shot  Guidance: ultrasound guided    Needle   Needle type: Other   Needle gauge: 21 G  Needle localization: ultrasound guidance  Needle length: 10 cmOther needle type: STIMUPLEX  Assessment   Injection assessment: negative aspiration for heme, no paresthesia on injection, local visualized surrounding nerve on ultrasound and no intravascular symptoms  Hemodynamics: stable  Outcomes: patient tolerated procedure well    Additional Notes  Leonila TREADWELL witnessed timeout and block written on correct side.

## 2024-04-24 ENCOUNTER — HOSPITAL ENCOUNTER (OUTPATIENT)
Facility: HOSPITAL | Age: 69
Discharge: HOME | End: 2024-04-27

## 2025-01-03 ENCOUNTER — HOSPITAL ENCOUNTER (OUTPATIENT)
Facility: HOSPITAL | Age: 70
Discharge: HOME | End: 2025-01-03
Payer: MEDICARE

## 2025-01-03 DIAGNOSIS — M05.79 SEROPOSITIVE RHEUMATOID ARTHRITIS OF MULTIPLE SITES (HCC): ICD-10-CM

## 2025-01-03 PROCEDURE — 72050 X-RAY EXAM NECK SPINE 4/5VWS: CPT

## (undated) DEVICE — SPLINT CAST W4XL15IN GRN STRENGTH PLSTR OF PARIS FAST SET

## (undated) DEVICE — SUTURE MONOCRYL + SZ 4-0 L27IN ABSRB UD L19MM PS-2 3/8 CIR MCP426H

## (undated) DEVICE — CARDINAL HEALTH VESSEL LOOPS MINI RED: Brand: DEVON

## (undated) DEVICE — SUTURE PERMA-HAND SZ 2-0 L30IN NONABSORBABLE BLK L26MM SH K833H

## (undated) DEVICE — SOLUTION IRRIG 1000ML STRL H2O USP PLAS POUR BTL

## (undated) DEVICE — SPONGE GZ W4XL4IN COT 12 PLY TYP VII WVN C FLD DSGN STERILE

## (undated) DEVICE — BIT DRL L75/31MM DIA1MM FOR 1.3MM SCR PILOT THRD H K WIRE

## (undated) DEVICE — BANDAGE COMPR W3INXL5YD TAN POLY COHESIVE CARING SGL

## (undated) DEVICE — GLOVE ORANGE PI 7   MSG9070

## (undated) DEVICE — DRESSING GZ FLUF 36X36 IN RND 2 PLY PD GZ AMER WHT CROSS

## (undated) DEVICE — HAND-SFMCASU: Brand: MEDLINE INDUSTRIES, INC.

## (undated) DEVICE — ZIMMER® STERILE DISPOSABLE TOURNIQUET CUFF WITH PROTECTIVE SLEEVE AND PLC, DUAL PORT, SINGLE BLADDER, 18 IN. (46 CM)

## (undated) DEVICE — DRESSING PETRO W3XL3IN OIL EMUL N ADH GZ KNIT IMPREG CELOS

## (undated) DEVICE — GLOVE SURG SZ 7 L12IN FNGR THK79MIL GRN LTX FREE

## (undated) DEVICE — SUTURE ETHILON SZ 4-0 L18IN NONABSORBABLE BLK L19MM PS-2 3/8 1667H

## (undated) DEVICE — SOLUTION IRRIG 500ML 0.9% SOD CHLO USP POUR PLAS BTL

## (undated) DEVICE — GLOVE SURG SZ 75 L12IN FNGR THK94MIL STD WHT LTX FREE